# Patient Record
Sex: FEMALE | URBAN - METROPOLITAN AREA
[De-identification: names, ages, dates, MRNs, and addresses within clinical notes are randomized per-mention and may not be internally consistent; named-entity substitution may affect disease eponyms.]

---

## 2017-05-24 ENCOUNTER — IMPORTED ENCOUNTER (OUTPATIENT)
Dept: URBAN - METROPOLITAN AREA CLINIC 9 | Facility: CLINIC | Age: 82
End: 2017-05-24

## 2017-06-16 ENCOUNTER — IMPORTED ENCOUNTER (OUTPATIENT)
Dept: URBAN - METROPOLITAN AREA CLINIC 9 | Facility: CLINIC | Age: 82
End: 2017-06-16

## 2017-08-22 ENCOUNTER — IMPORTED ENCOUNTER (OUTPATIENT)
Dept: URBAN - METROPOLITAN AREA CLINIC 9 | Facility: CLINIC | Age: 82
End: 2017-08-22

## 2018-10-22 ENCOUNTER — IMPORTED ENCOUNTER (OUTPATIENT)
Dept: URBAN - METROPOLITAN AREA CLINIC 9 | Facility: CLINIC | Age: 83
End: 2018-10-22

## 2019-06-21 NOTE — PATIENT DISCUSSION
1.  Pseudophakia OU - IOLs stable. Monitor. Restor MF OD-;; OS displaced and had to put std IOL for dist.   OPen capsules ou. 2.  Dry Eye OU:  Continue current management with Artificial Tears. 3.  LId irritated OD--Gave sample of Alrex tid till gone. Tears PF often. 4. Defer rx mrx for next visit.  5.  RTN 1 week MRX and FU on OD

## 2019-06-26 NOTE — PATIENT DISCUSSION
1.  Pseudophakia OU - IOLs stable. Monitor. Restor MF OD-;; OS displaced and had to put std IOL for dist.   OPen capsules ou. 2.  Dry Eye OU:  Continue current management with Artificial Tears. 3.  LId irritated OD- better -DC Alrex. Tears PF often. 4. Rx change given.   5.  RTN 1 yr CE

## 2019-10-24 ENCOUNTER — IMPORTED ENCOUNTER (OUTPATIENT)
Dept: URBAN - METROPOLITAN AREA CLINIC 9 | Facility: CLINIC | Age: 84
End: 2019-10-24

## 2020-07-22 ENCOUNTER — MOHS SURGERY-ROUTINE (OUTPATIENT)
Dept: URBAN - METROPOLITAN AREA CLINIC 12 | Facility: CLINIC | Age: 85
Setting detail: DERMATOLOGY
End: 2020-07-22

## 2020-07-22 DIAGNOSIS — L81.0 POSTINFLAMMATORY HYPERPIGMENTATION: ICD-10-CM

## 2020-07-22 DIAGNOSIS — D22.5 MELANOCYTIC NEVI OF TRUNK: ICD-10-CM

## 2020-07-22 DIAGNOSIS — L82.1 OTHER SEBORRHEIC KERATOSIS: ICD-10-CM

## 2020-07-22 DIAGNOSIS — D18.01 HEMANGIOMA OF SKIN AND SUBCUTANEOUS TISSUE: ICD-10-CM

## 2020-07-22 PROCEDURE — 11102 TANGNTL BX SKIN SINGLE LES: CPT

## 2020-07-22 PROCEDURE — 17311 MOHS 1 STAGE H/N/HF/G: CPT

## 2020-07-22 PROCEDURE — 13132 CMPLX RPR F/C/C/M/N/AX/G/H/F: CPT

## 2020-07-22 PROCEDURE — 88331 PATH CONSLTJ SURG 1 BLK 1SPC: CPT

## 2021-02-02 ENCOUNTER — IMPORTED ENCOUNTER (OUTPATIENT)
Dept: URBAN - METROPOLITAN AREA CLINIC 9 | Facility: CLINIC | Age: 86
End: 2021-02-02

## 2021-10-16 ASSESSMENT — VISUAL ACUITY
OD_CC: 20/25 SN
OS_SC: 20/25 - SN
OD_SC: 20/200 SN
OS_SC: 20/30 - SN
OS_SC: 20/25 SN
OD_SC: 20/200 SN
OD_PH: 20/40 - SN
OS_SC: 20/25 + SN
OD_CC: 20/25 SN
OD_CC: 20/20 - SN
OS_SC: 20/50 SN
OD_SC: 20/200 SN
OD_SC: 20/200 SN
OS_SC: 20/25 SN
OS_CC: 20/20 SN
OS_CC: 20/20 SN
OS_CC: 20/25 SN

## 2021-10-16 ASSESSMENT — KERATOMETRY
OD_AXISANGLE_DEGREES: 35
OD_AXISANGLE_DEGREES: 175
OD_AXISANGLE2_DEGREES: 10
OD_AXISANGLE_DEGREES: 72
OS_AXISANGLE2_DEGREES: 22
OD_K2POWER_DIOPTERS: 44.25
OS_K2POWER_DIOPTERS: 43.75
OS_AXISANGLE_DEGREES: 145
OS_K1POWER_DIOPTERS: 43.5
OS_K1POWER_DIOPTERS: 43.5
OD_AXISANGLE2_DEGREES: 125
OS_K2POWER_DIOPTERS: 43.75
OD_K1POWER_DIOPTERS: 43.75
OD_AXISANGLE2_DEGREES: 85
OD_K1POWER_DIOPTERS: 43.5
OS_K1POWER_DIOPTERS: 43.25
OS_AXISANGLE2_DEGREES: 32
OD_K2POWER_DIOPTERS: 49.5
OS_K2POWER_DIOPTERS: 43.5
OD_K2POWER_DIOPTERS: 44
OS_AXISANGLE2_DEGREES: 55
OS_AXISANGLE_DEGREES: 112
OD_AXISANGLE2_DEGREES: 162
OS_K1POWER_DIOPTERS: 43.25
OD_K1POWER_DIOPTERS: 43.25
OD_K1POWER_DIOPTERS: 43.75
OS_K2POWER_DIOPTERS: 43.75
OD_K2POWER_DIOPTERS: 43.75
OS_AXISANGLE_DEGREES: 122
OD_AXISANGLE_DEGREES: 100

## 2021-10-16 ASSESSMENT — TONOMETRY
OD_IOP_MMHG: 16
OS_IOP_MMHG: 19
OS_IOP_MMHG: 15
OS_IOP_MMHG: 13
OD_IOP_MMHG: 15
OD_IOP_MMHG: 18
OD_IOP_MMHG: 19
OS_IOP_MMHG: 14
OS_IOP_MMHG: 20
OD_IOP_MMHG: 14

## 2022-06-29 NOTE — PATIENT DISCUSSION
Discussed presence of  ERM. Recommended observation for now. Can consider surgical intervention in the future if the ERM progresses and the patient becomes more symptomatic.